# Patient Record
Sex: FEMALE | Race: BLACK OR AFRICAN AMERICAN | NOT HISPANIC OR LATINO | Employment: STUDENT | URBAN - METROPOLITAN AREA
[De-identification: names, ages, dates, MRNs, and addresses within clinical notes are randomized per-mention and may not be internally consistent; named-entity substitution may affect disease eponyms.]

---

## 2024-03-24 ENCOUNTER — APPOINTMENT (OUTPATIENT)
Dept: GENERAL RADIOLOGY | Facility: CLINIC | Age: 24
End: 2024-03-24
Attending: STUDENT IN AN ORGANIZED HEALTH CARE EDUCATION/TRAINING PROGRAM

## 2024-03-24 ENCOUNTER — HOSPITAL ENCOUNTER (EMERGENCY)
Facility: CLINIC | Age: 24
Discharge: HOME | End: 2024-03-25
Attending: STUDENT IN AN ORGANIZED HEALTH CARE EDUCATION/TRAINING PROGRAM | Admitting: STUDENT IN AN ORGANIZED HEALTH CARE EDUCATION/TRAINING PROGRAM

## 2024-03-24 VITALS
TEMPERATURE: 98.8 F | BODY MASS INDEX: 18.37 KG/M2 | OXYGEN SATURATION: 100 % | WEIGHT: 110.23 LBS | DIASTOLIC BLOOD PRESSURE: 69 MMHG | SYSTOLIC BLOOD PRESSURE: 109 MMHG | RESPIRATION RATE: 16 BRPM | HEIGHT: 65 IN | HEART RATE: 85 BPM

## 2024-03-24 DIAGNOSIS — J10.1 INFLUENZA A: ICD-10-CM

## 2024-03-24 LAB
FLUAV RNA SPEC QL NAA+PROBE: POSITIVE
FLUBV RNA RESP QL NAA+PROBE: NEGATIVE
GROUP A STREP BY PCR: NOT DETECTED
RSV RNA SPEC NAA+PROBE: NEGATIVE
SARS-COV-2 RNA RESP QL NAA+PROBE: NEGATIVE

## 2024-03-24 PROCEDURE — 99284 EMERGENCY DEPT VISIT MOD MDM: CPT | Mod: 25

## 2024-03-24 PROCEDURE — 250N000013 HC RX MED GY IP 250 OP 250 PS 637: Performed by: STUDENT IN AN ORGANIZED HEALTH CARE EDUCATION/TRAINING PROGRAM

## 2024-03-24 PROCEDURE — 87651 STREP A DNA AMP PROBE: CPT | Performed by: STUDENT IN AN ORGANIZED HEALTH CARE EDUCATION/TRAINING PROGRAM

## 2024-03-24 PROCEDURE — 250N000009 HC RX 250: Performed by: STUDENT IN AN ORGANIZED HEALTH CARE EDUCATION/TRAINING PROGRAM

## 2024-03-24 PROCEDURE — 250N000011 HC RX IP 250 OP 636: Performed by: STUDENT IN AN ORGANIZED HEALTH CARE EDUCATION/TRAINING PROGRAM

## 2024-03-24 PROCEDURE — 71046 X-RAY EXAM CHEST 2 VIEWS: CPT

## 2024-03-24 PROCEDURE — 87637 SARSCOV2&INF A&B&RSV AMP PRB: CPT | Performed by: STUDENT IN AN ORGANIZED HEALTH CARE EDUCATION/TRAINING PROGRAM

## 2024-03-24 RX ORDER — IBUPROFEN 600 MG/1
600 TABLET, FILM COATED ORAL ONCE
Status: COMPLETED | OUTPATIENT
Start: 2024-03-24 | End: 2024-03-24

## 2024-03-24 RX ORDER — ONDANSETRON 2 MG/ML
4 INJECTION INTRAMUSCULAR; INTRAVENOUS ONCE
Status: DISCONTINUED | OUTPATIENT
Start: 2024-03-24 | End: 2024-03-24

## 2024-03-24 RX ORDER — DEXAMETHASONE SODIUM PHOSPHATE 10 MG/ML
10 INJECTION, SOLUTION INTRAMUSCULAR; INTRAVENOUS ONCE
Status: COMPLETED | OUTPATIENT
Start: 2024-03-24 | End: 2024-03-24

## 2024-03-24 RX ORDER — BENZONATATE 100 MG/1
100-200 CAPSULE ORAL 3 TIMES DAILY PRN
Qty: 20 CAPSULE | Refills: 0 | Status: SHIPPED | OUTPATIENT
Start: 2024-03-24 | End: 2024-03-24

## 2024-03-24 RX ORDER — BENZONATATE 100 MG/1
100-200 CAPSULE ORAL 3 TIMES DAILY PRN
Qty: 20 CAPSULE | Refills: 0 | Status: SHIPPED | OUTPATIENT
Start: 2024-03-24

## 2024-03-24 RX ORDER — ONDANSETRON 4 MG/1
4 TABLET, ORALLY DISINTEGRATING ORAL EVERY 8 HOURS PRN
Qty: 10 TABLET | Refills: 0 | Status: SHIPPED | OUTPATIENT
Start: 2024-03-24 | End: 2024-03-24

## 2024-03-24 RX ORDER — ONDANSETRON 4 MG/1
4 TABLET, ORALLY DISINTEGRATING ORAL ONCE
Status: COMPLETED | OUTPATIENT
Start: 2024-03-24 | End: 2024-03-24

## 2024-03-24 RX ORDER — ONDANSETRON 4 MG/1
4 TABLET, ORALLY DISINTEGRATING ORAL EVERY 8 HOURS PRN
Qty: 10 TABLET | Refills: 0 | Status: SHIPPED | OUTPATIENT
Start: 2024-03-24

## 2024-03-24 RX ADMIN — DEXAMETHASONE SODIUM PHOSPHATE 10 MG: 10 INJECTION INTRAMUSCULAR; INTRAVENOUS at 16:44

## 2024-03-24 RX ADMIN — IBUPROFEN 600 MG: 600 TABLET ORAL at 16:44

## 2024-03-24 RX ADMIN — ONDANSETRON 4 MG: 4 TABLET, ORALLY DISINTEGRATING ORAL at 16:44

## 2024-03-24 ASSESSMENT — ACTIVITIES OF DAILY LIVING (ADL)
ADLS_ACUITY_SCORE: 35

## 2024-03-24 NOTE — ED PROVIDER NOTES
"  History     Chief Complaint:  Flu Symptoms       HPI   Elias Lockhart is a 23 year old female here for evaluation of flu symptoms. Patient states that she developed a sore throat six days ago. Over the past few days she has developed a productive cough, congestion, nausea, vomiting, decreased apetite, and some shortness of breath. Also feels warm to the touch, has not taken temperature. No longer has a sore throat. She has recently been exposed to someone with influenza and strep. She has been taking ibuprofen and tylenol. Last dose of tylenol was this morning. Denies fevers, chills, diarrhea, hematuria and dysuria     Patient visiting from [place] for a choice program for 6 months. Staying with host family    Independent Historian:   None - Patient Only    Review of External Notes:   None      Medications:    The patient is currently on no regular medications.    Past Medical History:    There is no pertinent past medical history.     Physical Exam   Patient Vitals for the past 24 hrs:   BP Temp Temp src Pulse Resp SpO2 Height Weight   03/24/24 1603 109/69 98.8  F (37.1  C) Temporal 107 16 99 % 1.651 m (5' 5\") 50 kg (110 lb 3.7 oz)        Physical Exam  Vital signs and nursing notes reviewed.    General:  Alert and oriented, no acute distress. Resting on bed with host at bedside.   Skin: Skin is warm and dry. No rashes, lesions, or erythema. No diaphoresis.  HEENT:   Head: Normocephalic, atraumatic. Facial features symmetric.   Eyes: Conjunctiva pink, sclera white. EOMs grossly intact.   Ears: Auricles without lesion, erythema, or edema.  Bilateral cerumen noted in tympanic membrane's.  No erythema or bulging of visualized TM  Nose: Symmetric with no discharge.  Mouth and throat: Lips are moist with no lesions or edema, oropharyngeal mucosa is mildly erythematous and moist without lesions or exudate. Uvula is midline.  Neck: Normal range of motion. Neck supple with no lymphadenopathy.  CV:  Heart RRR " with no murmurs or extra heart sounds. 2+ radial and tibialis posterior pulses bilaterally. No peripheral edema.  Pulm/Chest: Chest wall expansion symmetric with no increased effort of breathing. Lungs clear and equal to auscultation bilaterally.   Abd: Abdomen is soft and nontender to palpation in all 4 quadrants with no guarding or rebound.   M/S: Moves all extremities spontaneously.  Psych: Normal mood and affect. Behavior is normal.       Emergency Department Course     Imaging:  Chest XR,  PA & LAT   Final Result   IMPRESSION: Heart size and pulmonary vascularity normal. Lumbar spinal curve.         Laboratory:  Labs Ordered and Resulted from Time of ED Arrival to Time of ED Departure   INFLUENZA A/B, RSV, & SARS-COV2 PCR - Abnormal       Result Value    Influenza A PCR Positive (*)     Influenza B PCR Negative      RSV PCR Negative      SARS CoV2 PCR Negative     GROUP A STREPTOCOCCUS PCR THROAT SWAB - Normal    Group A strep by PCR Not Detected          Procedures   None    Emergency Department Course & Assessments:    Interventions:  Medications   dexAMETHasone (PF) (DECADRON) injectable solution used ORALLY 10 mg (10 mg Oral $Given 3/24/24 1644)   ibuprofen (ADVIL/MOTRIN) tablet 600 mg (600 mg Oral $Given 3/24/24 1644)   ondansetron (ZOFRAN ODT) ODT tab 4 mg (4 mg Oral $Given 3/24/24 1644)     Independent Interpretation (X-rays, CTs, rhythm strip):  None    Assessments/Consultations/Discussion of Management or Tests:     ED Course as of 03/24/24 1841   Sun Mar 24, 2024   1634 I initially assessed the patient and obtained the above history and physical exam.     1717 I reassessed the patient and updated them on results and plan of care.        Social Determinants of Health affecting care:   None    Disposition:  The patient was discharged.     Impression & Plan        MIPS (If applicable):  N/A    Medical Decision Making:  Elias Lockhart is a 23 year old female who presents for evaluation of flulike  symptoms.  See HPI.  Initially tachycardic but this resolves in the ED.  No hypoxia or fever.  On exam she is sick appearing but nontoxic.  No respiratory distress.  Lungs are clear without wheezing or crackles bilaterally.  No evidence of otitis media and her abdomen is benign.  Influenza A testing is positive.  COVID, RSV, and strep is negative.  Chest x-ray without pneumothorax, pleural effusion, or infiltrate to suggest pneumonia.  There is no evidence of superimposed bacterial infection such as pneumonia, otitis media, bacteremia, severe sepsis, etc.  Using reasonable clinical judgment, patient is safe for discharge home.  She is tolerating oral intake.  She received a dose of Decadron here in the ED to help with sore throat and cough over the next 72 hours.  She will discharge home with antiemetic and Tessalon Perles.  They are instructed to follow-up with primary care if possible for recheck as needed and return to the ED with any confusion, weakness, progressive shortness of breath, persistent vomiting, or further emergent concerns.  Patient and her host are agreeable to plan and had questions answered.    Diagnosis:    ICD-10-CM    1. Influenza A  J10.1            Discharge Medications:  Current Discharge Medication List        START taking these medications    Details   benzonatate (TESSALON) 100 MG capsule Take 1-2 capsules (100-200 mg) by mouth 3 times daily as needed for cough  Qty: 20 capsule, Refills: 0      ondansetron (ZOFRAN ODT) 4 MG ODT tab Take 1 tablet (4 mg) by mouth every 8 hours as needed for nausea  Qty: 10 tablet, Refills: 0            Scribe Disclosure:  I, Victorina Winter, am serving as a scribe at 4:36 PM on 3/24/2024 to document services personally performed by Karishma Bingham PA-C based on my observations and the provider's statements to me.   3/24/2024   Karishma Bingham PA-C Victoria J. MINISTERIO Bingham on 3/24/2024 at 6:45 PM       Karishma Bingham PA-C  03/24/24 1080

## 2024-03-24 NOTE — ED TRIAGE NOTES
Patient c/o fever, bodyaches, cough since tuesday and has been around people with the influenza A .

## 2024-03-24 NOTE — DISCHARGE INSTRUCTIONS
Take 600 mg of ibuprofen every 6-8 hours for aches/fever.  Do not exceed 2400 mg in a 24-hour period.  You may also take 1000 mg of Tylenol every ~6 hours for aches/fever.  Do not exceed 4000 mg in 24 hours.  Steroid you received here in the ED should help with sore throat and cough over the next 72 hours.  You may take prescription cough medicine as needed.  You may also take antinausea medicine as prescribed.  Try and drink lots of oral fluids and get plenty of rest.  Return to the ED should you develop persistent or worsening shortness of breath, confusion or weakness, persistent vomiting or inability to tolerate oral intake, or any further emergent concerns.  I hope you feel better soon!  Discharge Instructions  Influenza    You were diagnosed today with influenza or influenza like illness.  Influenza is a respiratory (breathing) illness caused by influenza A or B viruses.  Influenza causes five primary symptoms: fever, headache, muscle aches/fatigue/malaise, sore throat and cough.  These symptoms start one to four days after you have been around a person with this illness. Influenza is spread through sneezing and coughing and can live on surfaces for several days.  It is usually contagious for 5 days but in some cases up to 10 days and often affects several family members. If you have a family member who is less than 2 years old, older than 65 years old, pregnant or has a serious medical condition, they should be seen right away by a provider to decide if they should take preventative medications. Although influenza will make you feel very ill, most patients don t require any specific treatment. An antiviral medication might be prescribed for certain groups of patients (older patients, younger patients, and those with certain chronic medical problems).    Generally, every Emergency Department visit should have a follow-up clinic visit with either a primary or a specialty clinic/provider. Please follow-up as  instructed by your emergency provider today.    Return to the Emergency Department if:  You have trouble breathing.  You develop pain in your chest.  You have signs of being dehydrated, such as dizziness or unable to urinate (pee) at least three times daily.  You are confused or severely weak.  You cannot stop vomiting (throwing up) or you cannot drink enough fluids.    In children, you should seek help if the child has any of the above or if child:  Has blue or purplish skin color.  Is so irritable that he or she does not want to be held.  Does not have tears when crying (in infants) or does not urinate at least three times daily.  Does not wake up easily.    What can I do to help myself?  Rest.  Fluids -- Drink hydrating solutions such as Gatorade  or Pedialyte  as often as you can. If you are drinking enough, you should pass urine at least every eight hours.  Tylenol  (acetaminophen) and Advil  (ibuprofen) can relieve fever, headache, and muscle aches. Do not give aspirin to children under 18 years old.   Antiviral treatment -- Antiviral medicines do not make the flu symptoms go away immediately.  They have only been shown to make the symptoms go away 12 to 24 hours sooner than they would without treatment.     Antibiotics -- Antibiotics are NOT useful for treating viral illnesses such as influenza. Antibiotics should only be used if there is a bacterial complication of the flu such as bacterial pneumonia, ear infection, or sinusitis.  Because you were diagnosed with a flu like illness you are very contagious.  This means you cannot work, attend school or  for at least 24 hours or until you no longer have a fever.  If you were given a prescription for medicine here today, be sure to read all of the information (including the package insert) that comes with your prescription.  This will include important information about the medicine, its side effects, and any warnings that you need to know about.  The  pharmacist who fills the prescription can provide more information and answer questions you may have about the medicine.  If you have questions or concerns that the pharmacist cannot address, please call or return to the Emergency Department.   Remember that you can always come back to the Emergency Department if you are not able to see your regular provider in the amount of time listed above, if you get any new symptoms, or if there is anything that worries you.

## 2024-03-25 ASSESSMENT — ACTIVITIES OF DAILY LIVING (ADL)
ADLS_ACUITY_SCORE: 35
ADLS_ACUITY_SCORE: 35